# Patient Record
Sex: MALE | Race: OTHER | NOT HISPANIC OR LATINO | URBAN - METROPOLITAN AREA
[De-identification: names, ages, dates, MRNs, and addresses within clinical notes are randomized per-mention and may not be internally consistent; named-entity substitution may affect disease eponyms.]

---

## 2020-05-19 ENCOUNTER — EMERGENCY (EMERGENCY)
Facility: HOSPITAL | Age: 25
LOS: 1 days | Discharge: HOME | End: 2020-05-19
Attending: EMERGENCY MEDICINE
Payer: COMMERCIAL

## 2020-05-19 VITALS
HEART RATE: 72 BPM | TEMPERATURE: 98 F | SYSTOLIC BLOOD PRESSURE: 118 MMHG | HEIGHT: 72 IN | WEIGHT: 190.04 LBS | DIASTOLIC BLOOD PRESSURE: 75 MMHG | RESPIRATION RATE: 20 BRPM | OXYGEN SATURATION: 95 %

## 2020-05-19 PROCEDURE — 71045 X-RAY EXAM CHEST 1 VIEW: CPT | Mod: 26

## 2020-05-19 PROCEDURE — 71045 X-RAY EXAM CHEST 1 VIEW: CPT

## 2020-05-19 PROCEDURE — 99284 EMERGENCY DEPT VISIT MOD MDM: CPT | Mod: 25

## 2020-05-19 PROCEDURE — 94640 AIRWAY INHALATION TREATMENT: CPT

## 2020-05-19 PROCEDURE — 99285 EMERGENCY DEPT VISIT HI MDM: CPT

## 2020-05-19 RX ORDER — IPRATROPIUM/ALBUTEROL SULFATE 18-103MCG
3 AEROSOL WITH ADAPTER (GRAM) INHALATION ONCE
Refills: 0 | Status: COMPLETED | OUTPATIENT
Start: 2020-05-19 | End: 2020-05-19

## 2020-05-19 RX ORDER — LORATADINE 10 MG/1
10 TABLET ORAL DAILY
Refills: 0 | Status: DISCONTINUED | OUTPATIENT
Start: 2020-05-19 | End: 2020-05-24

## 2020-05-19 RX ORDER — FLUTICASONE PROPIONATE 50 MCG
1 SPRAY, SUSPENSION NASAL ONCE
Refills: 0 | Status: COMPLETED | OUTPATIENT
Start: 2020-05-19 | End: 2020-05-19

## 2020-05-19 RX ORDER — FLUTICASONE PROPIONATE 50 MCG
50 SPRAY, SUSPENSION NASAL
Qty: 1 | Refills: 0
Start: 2020-05-19

## 2020-05-19 RX ORDER — ALBUTEROL 90 UG/1
3 AEROSOL, METERED ORAL
Qty: 1 | Refills: 0
Start: 2020-05-19 | End: 2020-05-25

## 2020-05-19 RX ADMIN — Medication 3 MILLILITER(S): at 18:38

## 2020-05-19 RX ADMIN — LORATADINE 10 MILLIGRAM(S): 10 TABLET ORAL at 19:45

## 2020-05-19 RX ADMIN — Medication 1 SPRAY(S): at 19:45

## 2020-05-19 RX ADMIN — Medication 60 MILLIGRAM(S): at 18:38

## 2020-05-19 NOTE — ED STATDOCS - OBJECTIVE STATEMENT
23 y/o M pt with significant PMHx of asthma presents to the ED c/o asthma and seasonal allergies that began 8 days ago. Pt takes albuterol nebulizer twice a day but does not take pill medications for asthma. he states that his allergies have become "bad" and he constantly feels congested and his eyes are red. Pt denies fever. No further complaints at this time.

## 2020-05-19 NOTE — ED STATDOCS - PATIENT PORTAL LINK FT
You can access the FollowMyHealth Patient Portal offered by [facility] by registering at the following website: [email or web address]. By joining [employer]’s FollowMyHealth portal, you will also be able to view your health information using other applications (apps) compatible with our system.

## 2020-05-19 NOTE — ED STATDOCS - NSFOLLOWUPINSTRUCTIONS_ED_ALL_ED_FT
Asthma    Asthma is a condition in which the airways tighten and narrow, making it difficult to breath. Asthma episodes, also called asthma attacks, range from minor to life-threatening. Symptoms include wheezing, coughing, chest tightness, or shortness of breath. The diagnosis of asthma is made by a review of your medical history and a physical exam, but may involve additional testing. Asthma cannot be cured, but medicines and lifestyle changes can help control it. Avoid triggers of asthma which may include animal dander, pollen, mold, smoke, air pollutants, etc.     SEEK IMMEDIATE MEDICAL CARE IF YOU HAVE ANY OF THE FOLLOWING SYMPTOMS: worsening of symptoms, shortness of breath at rest, chest pain, bluish discoloration to lips or fingertips, lightheadedness/dizziness, or fever.     follow up with a pulmonologist in 1 to 3 days

## 2020-05-19 NOTE — ED STATDOCS - PROGRESS NOTE DETAILS
pt feels better post nebulizer, lungs CTA bilaterally. will dc home and rx meds and refer to pulmonologist

## 2020-05-19 NOTE — ED ADULT NURSE NOTE - OBJECTIVE STATEMENT
pt with reports of asthma exacerbation. pt awake alert oriented in no resp distress, wheezing noted on exam. afebrile, denies fevers at home. even and unlabored resps present.

## 2020-05-19 NOTE — ED STATDOCS - CPE ED MUSC NORM
How Many Skin Cancers Have You Had?: one What Is The Reason For Today's Visit?: Surveillance against skin cancer recurrences When Was Your Last Cancer Diagnosed?: 2016 normal...

## 2020-05-19 NOTE — ED STATDOCS - CARE PROVIDER_API CALL
Pb Solis  CRITICAL CARE MEDICINE  [address] [place] [id number]  Phone: [phone]  Fax: [phone]  Follow Up Time: 1-3 Days

## 2020-05-19 NOTE — ED STATDOCS - ATTENDING CONTRIBUTION TO CARE
I, Edmundo Balderrama, performed the initial face to face bedside interview with this patient regarding history of present illness, review of symptoms and relevant past medical, social and family history.  I completed an independent physical examination.  I was the initial provider who evaluated this patient. I have signed out the follow up of any pending tests (i.e. labs, radiological studies) to the ACP.  I have communicated the patient’s plan of care and disposition with the ACP.

## 2020-05-19 NOTE — ED ADULT TRIAGE NOTE - CHIEF COMPLAINT QUOTE
21 M, nad, breathing even and unlabored, alert and oriented x 4 c/o asthma/ allergy exacerbation x 9 days and not relieved by claritin, dayquil and home neb treatments. Pt denies fevers, denies sick contacts, denies recent travel. 24 M, nad, breathing even and unlabored, alert and oriented x 4 c/o asthma/ allergy exacerbation x 9 days and not relieved by claritin, dayquil and home neb treatments. Pt denies fevers, denies sick contacts, denies recent travel.

## 2020-05-19 NOTE — ED STATDOCS - CARE PROVIDERS DIRECT ADDRESSES
[email or web address]

## 2020-05-19 NOTE — ED STATDOCS - CLINICAL SUMMARY MEDICAL DECISION MAKING FREE TEXT BOX
Consistent with allergy induced asthma exacerbation. Check CXR and medically treat. Consistent with allergy induced asthma exacerbation. XR clear, no hypoxia or increased resp effort, stable for dc

## 2020-05-19 NOTE — ED ADULT NURSE NOTE - CHIEF COMPLAINT QUOTE
24 M, nad, breathing even and unlabored, alert and oriented x 4 c/o asthma/ allergy exacerbation x 9 days and not relieved by claritin, dayquil and home neb treatments. Pt denies fevers, denies sick contacts, denies recent travel.

## 2020-11-11 ENCOUNTER — EMERGENCY (EMERGENCY)
Facility: HOSPITAL | Age: 25
LOS: 1 days | Discharge: HOME | End: 2020-11-11
Attending: EMERGENCY MEDICINE
Payer: COMMERCIAL

## 2020-11-11 VITALS
HEIGHT: 72 IN | OXYGEN SATURATION: 100 % | SYSTOLIC BLOOD PRESSURE: 107 MMHG | RESPIRATION RATE: 16 BRPM | DIASTOLIC BLOOD PRESSURE: 58 MMHG | TEMPERATURE: 99 F | HEART RATE: 61 BPM | WEIGHT: 190.04 LBS

## 2020-11-11 LAB
ALBUMIN SERPL ELPH-MCNC: 4.4 G/DL — SIGNIFICANT CHANGE UP (ref 3.3–5.2)
ALP SERPL-CCNC: 74 U/L — SIGNIFICANT CHANGE UP (ref 40–120)
ALT FLD-CCNC: 17 U/L — SIGNIFICANT CHANGE UP
ANION GAP SERPL CALC-SCNC: 10 MMOL/L — SIGNIFICANT CHANGE UP (ref 5–17)
APPEARANCE UR: CLEAR — SIGNIFICANT CHANGE UP
AST SERPL-CCNC: 26 U/L — SIGNIFICANT CHANGE UP
BASOPHILS # BLD AUTO: 0.03 K/UL — SIGNIFICANT CHANGE UP (ref 0–0.2)
BASOPHILS NFR BLD AUTO: 0.4 % — SIGNIFICANT CHANGE UP (ref 0–2)
BILIRUB SERPL-MCNC: 0.6 MG/DL — SIGNIFICANT CHANGE UP (ref 0.4–2)
BILIRUB UR-MCNC: NEGATIVE — SIGNIFICANT CHANGE UP
BUN SERPL-MCNC: 10 MG/DL — SIGNIFICANT CHANGE UP (ref 8–20)
CALCIUM SERPL-MCNC: 9.5 MG/DL — SIGNIFICANT CHANGE UP (ref 8.6–10.2)
CHLORIDE SERPL-SCNC: 98 MMOL/L — SIGNIFICANT CHANGE UP (ref 98–107)
CO2 SERPL-SCNC: 27 MMOL/L — SIGNIFICANT CHANGE UP (ref 22–29)
COLOR SPEC: YELLOW — SIGNIFICANT CHANGE UP
CREAT SERPL-MCNC: 1.02 MG/DL — SIGNIFICANT CHANGE UP (ref 0.5–1.3)
DIFF PNL FLD: NEGATIVE — SIGNIFICANT CHANGE UP
EOSINOPHIL # BLD AUTO: 0.24 K/UL — SIGNIFICANT CHANGE UP (ref 0–0.5)
EOSINOPHIL NFR BLD AUTO: 3.3 % — SIGNIFICANT CHANGE UP (ref 0–6)
GLUCOSE SERPL-MCNC: 123 MG/DL — HIGH (ref 70–99)
GLUCOSE UR QL: NEGATIVE MG/DL — SIGNIFICANT CHANGE UP
HCT VFR BLD CALC: 40.1 % — SIGNIFICANT CHANGE UP (ref 39–50)
HGB BLD-MCNC: 13.4 G/DL — SIGNIFICANT CHANGE UP (ref 13–17)
IMM GRANULOCYTES NFR BLD AUTO: 0.3 % — SIGNIFICANT CHANGE UP (ref 0–1.5)
KETONES UR-MCNC: NEGATIVE — SIGNIFICANT CHANGE UP
LEUKOCYTE ESTERASE UR-ACNC: NEGATIVE — SIGNIFICANT CHANGE UP
LIDOCAIN IGE QN: 13 U/L — LOW (ref 22–51)
LYMPHOCYTES # BLD AUTO: 2.34 K/UL — SIGNIFICANT CHANGE UP (ref 1–3.3)
LYMPHOCYTES # BLD AUTO: 31.8 % — SIGNIFICANT CHANGE UP (ref 13–44)
MCHC RBC-ENTMCNC: 31.6 PG — SIGNIFICANT CHANGE UP (ref 27–34)
MCHC RBC-ENTMCNC: 33.4 GM/DL — SIGNIFICANT CHANGE UP (ref 32–36)
MCV RBC AUTO: 94.6 FL — SIGNIFICANT CHANGE UP (ref 80–100)
MONOCYTES # BLD AUTO: 0.35 K/UL — SIGNIFICANT CHANGE UP (ref 0–0.9)
MONOCYTES NFR BLD AUTO: 4.7 % — SIGNIFICANT CHANGE UP (ref 2–14)
NEUTROPHILS # BLD AUTO: 4.39 K/UL — SIGNIFICANT CHANGE UP (ref 1.8–7.4)
NEUTROPHILS NFR BLD AUTO: 59.5 % — SIGNIFICANT CHANGE UP (ref 43–77)
NITRITE UR-MCNC: NEGATIVE — SIGNIFICANT CHANGE UP
PH UR: 7 — SIGNIFICANT CHANGE UP (ref 5–8)
PLATELET # BLD AUTO: 258 K/UL — SIGNIFICANT CHANGE UP (ref 150–400)
POTASSIUM SERPL-MCNC: 3.9 MMOL/L — SIGNIFICANT CHANGE UP (ref 3.5–5.3)
POTASSIUM SERPL-SCNC: 3.9 MMOL/L — SIGNIFICANT CHANGE UP (ref 3.5–5.3)
PROT SERPL-MCNC: 7 G/DL — SIGNIFICANT CHANGE UP (ref 6.6–8.7)
PROT UR-MCNC: NEGATIVE MG/DL — SIGNIFICANT CHANGE UP
RBC # BLD: 4.24 M/UL — SIGNIFICANT CHANGE UP (ref 4.2–5.8)
RBC # FLD: 11.4 % — SIGNIFICANT CHANGE UP (ref 10.3–14.5)
SODIUM SERPL-SCNC: 135 MMOL/L — SIGNIFICANT CHANGE UP (ref 135–145)
SP GR SPEC: 1 — LOW (ref 1.01–1.02)
TSH SERPL-MCNC: 1.06 UIU/ML — SIGNIFICANT CHANGE UP (ref 0.27–4.2)
UROBILINOGEN FLD QL: NEGATIVE MG/DL — SIGNIFICANT CHANGE UP
WBC # BLD: 7.37 K/UL — SIGNIFICANT CHANGE UP (ref 3.8–10.5)
WBC # FLD AUTO: 7.37 K/UL — SIGNIFICANT CHANGE UP (ref 3.8–10.5)

## 2020-11-11 PROCEDURE — 84443 ASSAY THYROID STIM HORMONE: CPT

## 2020-11-11 PROCEDURE — 99283 EMERGENCY DEPT VISIT LOW MDM: CPT

## 2020-11-11 PROCEDURE — 81003 URINALYSIS AUTO W/O SCOPE: CPT

## 2020-11-11 PROCEDURE — 83605 ASSAY OF LACTIC ACID: CPT

## 2020-11-11 PROCEDURE — 83690 ASSAY OF LIPASE: CPT

## 2020-11-11 PROCEDURE — U0003: CPT

## 2020-11-11 PROCEDURE — 80053 COMPREHEN METABOLIC PANEL: CPT

## 2020-11-11 PROCEDURE — 36415 COLL VENOUS BLD VENIPUNCTURE: CPT

## 2020-11-11 PROCEDURE — 99284 EMERGENCY DEPT VISIT MOD MDM: CPT

## 2020-11-11 PROCEDURE — 85025 COMPLETE CBC W/AUTO DIFF WBC: CPT

## 2020-11-11 NOTE — ED STATDOCS - CARE PROVIDER_API CALL
Karena Prasad  GASTROENTEROLOGY  [address] [facility] [facility] [address]  [place] [address]  Phone: [phone]  Fax: [phone]  Follow Up Time:

## 2020-11-11 NOTE — ED ADULT TRIAGE NOTE - CHIEF COMPLAINT QUOTE
Pt A&Ox4 states "I have preeti having diarrhea for a week and half." Patient having abd pain and diarrhea, no fever, no cough.

## 2020-11-11 NOTE — ED STATDOCS - OBJECTIVE STATEMENT
25 y/o M pt with significant PMHx of asthma presents to the ED c/o intermittent diarrhea directly after eating for about 2 weeks. Reports associated fatigue. Denies fevers. States he lost weight. He has been on the keto diet for weight loss. Denies feeling hot. No further complaints at this time.

## 2020-11-11 NOTE — ED STATDOCS - PATIENT PORTAL LINK FT
You can access the FollowMyHealth Patient Portal offered by [facility] by registering at the following website: [email or web address]. By joining [employer]’s FollowMyHealth portal, you will also be able to view your health information using other applications (apps) compatible with our system.

## 2020-11-12 PROBLEM — J45.909 UNSPECIFIED ASTHMA, UNCOMPLICATED: Chronic | Status: ACTIVE | Noted: 2020-05-19

## 2020-11-12 LAB — SARS-COV-2 RNA SPEC QL NAA+PROBE: SIGNIFICANT CHANGE UP

## 2021-09-10 ENCOUNTER — EMERGENCY (EMERGENCY)
Facility: HOSPITAL | Age: 26
LOS: 1 days | Discharge: HOME | End: 2021-09-10
Payer: COMMERCIAL

## 2021-09-10 VITALS
RESPIRATION RATE: 18 BRPM | DIASTOLIC BLOOD PRESSURE: 57 MMHG | OXYGEN SATURATION: 96 % | TEMPERATURE: 99 F | HEART RATE: 64 BPM | SYSTOLIC BLOOD PRESSURE: 102 MMHG

## 2021-09-10 VITALS — HEIGHT: 72 IN | WEIGHT: 179.9 LBS

## 2021-09-10 PROCEDURE — U0003: CPT

## 2021-09-10 PROCEDURE — 99282 EMERGENCY DEPT VISIT SF MDM: CPT

## 2021-09-10 PROCEDURE — U0005: CPT

## 2021-09-10 PROCEDURE — 99283 EMERGENCY DEPT VISIT LOW MDM: CPT

## 2021-09-10 NOTE — ED PROVIDER NOTE - PATIENT PORTAL LINK FT
You can access the FollowMyHealth Patient Portal offered by [facility] by registering at the following website: [email or web address]. By joining [employer]’s FollowMyHealth portal, you will also be able to view your health information using other applications (apps) compatible with our system.

## 2021-09-10 NOTE — ED PROVIDER NOTE - OBJECTIVE STATEMENT
COVID ASYMPTOMATIC SWAB  Pt presenting to the ER for COVID-19 testing. Denies fevers chills, loss of taste or smell, URI symptoms, chest pain or shortness of breath, nausea vomiting diarrhea abdominal pain, weakness or fatigue. Eating and drinking normal diet. Normal output. Pt requesting testing at this time. [] known exposure [] no-known exposure [] travel [] no travel [ ] smoker [ ] non-smoker  for event, denies any symptoms

## 2021-09-10 NOTE — ED PROVIDER NOTE - CLINICAL SUMMARY MEDICAL DECISION MAKING FREE TEXT BOX
Pt nontoxic appearing, stable vitals, ambulatory with stable saturation without supplemental oxygen. PT does not meet criteria listed in most updated guidelines as per [facility] protocol/algorithm for admission at this time. pt advised about self-quarantine instructions until negative test results and/or symptom resolution. pt advised on hand hygiene, monitoring of symptoms, antipyretic use as well as and fu with primary care provider. Instructions given in pre-printed copy.

## 2021-09-10 NOTE — ED PROVIDER NOTE - PHYSICAL EXAMINATION
Const: AOX3 nontoxic appearing, no apparent respiratory or physical distress. Stable gait   HEENT: NC/AT. Moist mucous membranes.  Eyes: ANIYAH. EOMI  Neck: Soft and supple. Full ROM without pain.  Resp: Speaking in full sentences. No evidence of respiratory distress. No wheezes, rales or rhonchi. No adventitious breath sounds   Back: Spine midline and non-tender. No CVAT.  Skin: No rashes, abrasions or lacerations.  Neuro: Awake, alert & oriented x 3. Moves all extremities symmetrically.

## 2021-09-11 LAB — SARS-COV-2 RNA SPEC QL NAA+PROBE: SIGNIFICANT CHANGE UP

## 2021-11-25 ENCOUNTER — EMERGENCY (EMERGENCY)
Facility: HOSPITAL | Age: 26
LOS: 1 days | Discharge: HOME | End: 2021-11-25
Attending: EMERGENCY MEDICINE
Payer: COMMERCIAL

## 2021-11-25 VITALS
HEART RATE: 59 BPM | OXYGEN SATURATION: 97 % | TEMPERATURE: 98 F | SYSTOLIC BLOOD PRESSURE: 111 MMHG | HEIGHT: 72 IN | RESPIRATION RATE: 20 BRPM | WEIGHT: 210.1 LBS | DIASTOLIC BLOOD PRESSURE: 56 MMHG

## 2021-11-25 LAB
ALBUMIN SERPL ELPH-MCNC: 4.4 G/DL — SIGNIFICANT CHANGE UP (ref 3.3–5.2)
ALP SERPL-CCNC: 110 U/L — SIGNIFICANT CHANGE UP (ref 40–120)
ALT FLD-CCNC: 16 U/L — SIGNIFICANT CHANGE UP
ANION GAP SERPL CALC-SCNC: 12 MMOL/L — SIGNIFICANT CHANGE UP (ref 5–17)
AST SERPL-CCNC: 27 U/L — SIGNIFICANT CHANGE UP
BASOPHILS # BLD AUTO: 0.02 K/UL — SIGNIFICANT CHANGE UP (ref 0–0.2)
BASOPHILS NFR BLD AUTO: 0.4 % — SIGNIFICANT CHANGE UP (ref 0–2)
BILIRUB SERPL-MCNC: 0.6 MG/DL — SIGNIFICANT CHANGE UP (ref 0.4–2)
BUN SERPL-MCNC: 14.2 MG/DL — SIGNIFICANT CHANGE UP (ref 8–20)
CALCIUM SERPL-MCNC: 9.5 MG/DL — SIGNIFICANT CHANGE UP (ref 8.6–10.2)
CHLORIDE SERPL-SCNC: 97 MMOL/L — LOW (ref 98–107)
CO2 SERPL-SCNC: 27 MMOL/L — SIGNIFICANT CHANGE UP (ref 22–29)
CREAT SERPL-MCNC: 1.27 MG/DL — SIGNIFICANT CHANGE UP (ref 0.5–1.3)
EOSINOPHIL # BLD AUTO: 0.08 K/UL — SIGNIFICANT CHANGE UP (ref 0–0.5)
EOSINOPHIL NFR BLD AUTO: 1.8 % — SIGNIFICANT CHANGE UP (ref 0–6)
GLUCOSE SERPL-MCNC: 83 MG/DL — SIGNIFICANT CHANGE UP (ref 70–99)
HCT VFR BLD CALC: 45.4 % — SIGNIFICANT CHANGE UP (ref 39–50)
HGB BLD-MCNC: 15.2 G/DL — SIGNIFICANT CHANGE UP (ref 13–17)
IMM GRANULOCYTES NFR BLD AUTO: 0.2 % — SIGNIFICANT CHANGE UP (ref 0–1.5)
LIDOCAIN IGE QN: 9 U/L — LOW (ref 22–51)
LYMPHOCYTES # BLD AUTO: 1.77 K/UL — SIGNIFICANT CHANGE UP (ref 1–3.3)
LYMPHOCYTES # BLD AUTO: 39.2 % — SIGNIFICANT CHANGE UP (ref 13–44)
MCHC RBC-ENTMCNC: 30.7 PG — SIGNIFICANT CHANGE UP (ref 27–34)
MCHC RBC-ENTMCNC: 33.5 GM/DL — SIGNIFICANT CHANGE UP (ref 32–36)
MCV RBC AUTO: 91.7 FL — SIGNIFICANT CHANGE UP (ref 80–100)
MONOCYTES # BLD AUTO: 0.66 K/UL — SIGNIFICANT CHANGE UP (ref 0–0.9)
MONOCYTES NFR BLD AUTO: 14.6 % — HIGH (ref 2–14)
NEUTROPHILS # BLD AUTO: 1.97 K/UL — SIGNIFICANT CHANGE UP (ref 1.8–7.4)
NEUTROPHILS NFR BLD AUTO: 43.8 % — SIGNIFICANT CHANGE UP (ref 43–77)
PLATELET # BLD AUTO: 241 K/UL — SIGNIFICANT CHANGE UP (ref 150–400)
POTASSIUM SERPL-MCNC: 4 MMOL/L — SIGNIFICANT CHANGE UP (ref 3.5–5.3)
POTASSIUM SERPL-SCNC: 4 MMOL/L — SIGNIFICANT CHANGE UP (ref 3.5–5.3)
PROT SERPL-MCNC: 7.3 G/DL — SIGNIFICANT CHANGE UP (ref 6.6–8.7)
RBC # BLD: 4.95 M/UL — SIGNIFICANT CHANGE UP (ref 4.2–5.8)
RBC # FLD: 11.2 % — SIGNIFICANT CHANGE UP (ref 10.3–14.5)
SODIUM SERPL-SCNC: 136 MMOL/L — SIGNIFICANT CHANGE UP (ref 135–145)
WBC # BLD: 4.51 K/UL — SIGNIFICANT CHANGE UP (ref 3.8–10.5)
WBC # FLD AUTO: 4.51 K/UL — SIGNIFICANT CHANGE UP (ref 3.8–10.5)

## 2021-11-25 PROCEDURE — 99284 EMERGENCY DEPT VISIT MOD MDM: CPT

## 2021-11-25 PROCEDURE — 36415 COLL VENOUS BLD VENIPUNCTURE: CPT

## 2021-11-25 PROCEDURE — 85025 COMPLETE CBC W/AUTO DIFF WBC: CPT

## 2021-11-25 PROCEDURE — 83690 ASSAY OF LIPASE: CPT

## 2021-11-25 PROCEDURE — 80053 COMPREHEN METABOLIC PANEL: CPT

## 2021-11-25 PROCEDURE — 99283 EMERGENCY DEPT VISIT LOW MDM: CPT

## 2021-11-25 RX ORDER — SODIUM CHLORIDE 9 MG/ML
1000 INJECTION INTRAMUSCULAR; INTRAVENOUS; SUBCUTANEOUS ONCE
Refills: 0 | Status: COMPLETED | OUTPATIENT
Start: 2021-11-25 | End: 2021-11-25

## 2021-11-25 RX ORDER — ONDANSETRON 8 MG/1
1 TABLET, FILM COATED ORAL
Qty: 12 | Refills: 0
Start: 2021-11-25 | End: 2021-11-27

## 2021-11-25 RX ADMIN — SODIUM CHLORIDE 1000 MILLILITER(S): 9 INJECTION INTRAMUSCULAR; INTRAVENOUS; SUBCUTANEOUS at 17:47

## 2021-11-25 NOTE — ED STATDOCS - OBJECTIVE STATEMENT
26 y/o male with PMHx of asthma presents to ED c/o nausea. Patient reports 25 yr old M presented ot ED with a 4 day history oa abdominal discomfort and sensation of feeling bloating. Pt denies any specific abdominal pain and explained that x 2 day ago he had 2-3 episode of vomiting and diarrhea. Pt explained that his diarrhea was non foul smelling and non bloody. Pt also says that his vomiting was non bloody. Pt also had 2 episode of diarrhea today but no vomiting. Pt also denies any fever, chills , body aches , shortness of breath or difficulty breathing. Pt denies any contact with any COVID19 patients and is fully vaccinated. Pt also denies any recent  travels outside the [place]. 25 yr old M presented ot ED with a 4 day history of abdominal discomfort and sensation of feeling bloating. Pt denies any specific abdominal pain and explained that x 2 day ago he had 2-3 episode of vomiting and diarrhea. Pt explained that his diarrhea was non foul smelling and non bloody. Pt also says that his vomiting was non bloody. Pt also had 2 episode of diarrhea today but no vomiting. Pt also denies any fever, chills , body aches , shortness of breath or difficulty breathing. Pt denies any contact with any COVID19 patients and is fully vaccinated. Pt also denies any recent  travels outside the [place].

## 2021-11-25 NOTE — ED STATDOCS - PROGRESS NOTE DETAILS
PT reports relief of abdominal pain. Abdomen is soft, non tender, no rebound, no guarding at time of discharge. Abdominal pain precautions reviewed.

## 2021-11-25 NOTE — ED STATDOCS - CLINICAL SUMMARY MEDICAL DECISION MAKING FREE TEXT BOX
25 yr old M presented ot ED with a 4 day history oa abdominal discomfort and sensation of feeling bloating. Pt denies any specific abdominal pain and explained that x 2 day ago he had 2-3 episode of vomiting and diarrhea. Pt explained that his diarrhea was non foul smelling and non bloody. Pt also says that his vomiting was non bloody. Pt also had 2 episode of diarrhea today but no vomiting. Examination within acceptable limits.

## 2021-11-25 NOTE — ED ADULT TRIAGE NOTE - CHIEF COMPLAINT QUOTE
Pt arrives to ED c/o nausea and vomiting since yesterday " I think its food positioning " + diarrhea

## 2021-11-25 NOTE — ED STATDOCS - NS_ ATTENDINGSCRIBEDETAILS _ED_A_ED_FT
I, Irvin Monterroso, performed the initial face to face bedside interview with this patient regarding history of present illness, review of symptoms and relevant past medical, social and family history.  I completed an independent physical examination.  I was the initial provider who evaluated this patient. I have signed out the follow up of any pending tests (i.e. labs, radiological studies) to the ACP.  I have communicated the patient’s plan of care and disposition with the ACP.  The history, relevant review of systems, past medical and surgical history, medical decision making, and physical examination was documented by the scribe in my presence and I attest to the accuracy of the documentation.

## 2021-11-25 NOTE — ED STATDOCS - PATIENT PORTAL LINK FT
You can access the FollowMyHealth Patient Portal offered by [facility] by registering at the following website: [email or web address]. By joining [employer]’s FollowMyHealth portal, you will also be able to view your health information using other applications (apps) compatible with our system.

## 2022-04-29 ENCOUNTER — EMERGENCY (EMERGENCY)
Facility: HOSPITAL | Age: 27
LOS: 1 days | Discharge: HOME | End: 2022-04-29
Attending: EMERGENCY MEDICINE
Payer: COMMERCIAL

## 2022-04-29 VITALS
RESPIRATION RATE: 18 BRPM | OXYGEN SATURATION: 97 % | HEIGHT: 72 IN | SYSTOLIC BLOOD PRESSURE: 113 MMHG | TEMPERATURE: 98 F | HEART RATE: 61 BPM | WEIGHT: 220.02 LBS | DIASTOLIC BLOOD PRESSURE: 68 MMHG

## 2022-04-29 PROCEDURE — 99284 EMERGENCY DEPT VISIT MOD MDM: CPT

## 2022-04-29 PROCEDURE — 99283 EMERGENCY DEPT VISIT LOW MDM: CPT | Mod: 25

## 2022-04-29 PROCEDURE — 94640 AIRWAY INHALATION TREATMENT: CPT

## 2022-04-29 RX ORDER — IPRATROPIUM/ALBUTEROL SULFATE 18-103MCG
3 AEROSOL WITH ADAPTER (GRAM) INHALATION
Refills: 0 | Status: DISCONTINUED | OUTPATIENT
Start: 2022-04-29 | End: 2022-05-04

## 2022-04-29 RX ORDER — FLUTICASONE PROPIONATE 50 MCG
2 SPRAY, SUSPENSION NASAL
Qty: 1 | Refills: 1
Start: 2022-04-29

## 2022-04-29 RX ORDER — AZELASTINE HCL 0.05 %
1 DROPS OPHTHALMIC (EYE)
Qty: 1 | Refills: 0
Start: 2022-04-29

## 2022-04-29 RX ORDER — ALBUTEROL 90 UG/1
2 AEROSOL, METERED ORAL
Qty: 1 | Refills: 1
Start: 2022-04-29

## 2022-04-29 RX ADMIN — Medication 60 MILLIGRAM(S): at 16:00

## 2022-04-29 RX ADMIN — Medication 3 MILLILITER(S): at 16:00

## 2022-04-29 NOTE — ED PROVIDER NOTE - ATTENDING APP SHARED VISIT CONTRIBUTION OF CARE
The patient seen and examined    Asthma Exacerbation    I, Chepe Bowden, performed the initial face to face bedside interview with this patient regarding history of present illness, review of symptoms and relevant past medical, social and family history.  I completed an independent physical examination.  I was the initial provider who evaluated this patient. I have signed out the follow up of any pending tests (i.e. labs, radiological studies) to the ACP.  I have communicated the patient’s plan of care and disposition with the ACP.

## 2022-04-29 NOTE — ED PROVIDER NOTE - PATIENT PORTAL LINK FT
You can access the FollowMyHealth Patient Portal offered by [facility] by registering at the following website: [email or web address]. By joining [facility]’s FollowMyHealth portal, you will also be able to view your health information using other applications (apps) compatible with our system.

## 2022-04-29 NOTE — ED ADULT TRIAGE NOTE - CHIEF COMPLAINT QUOTE
Patient arrived to ED today with c/o asthma exacerbation.  Patient states he believes allergies are making his asthma worse.  Patient states wheezing, eye irritation, runny nose. Patient arrived to ED today with c/o asthma exacerbation.  Patient states he believes allergies are making his asthma worse.  Patient states wheezing, throat pains, eye irritation, runny nose.

## 2022-04-29 NOTE — ED ADULT NURSE NOTE - OBJECTIVE STATEMENT
Assumed care of patient in ED alert and oriented x4, resting comfortably, c/o asthma exacerbation x 4 days. Pt stated he has been having seasonal allergies, was recently diagnosed with strep throat and now c/o asthma. Medications given, respirartions even non labored. Bilateral wheezing noted.

## 2022-04-29 NOTE — ED PROVIDER NOTE - OBJECTIVE STATEMENT
26 yr old M presented to ED for asthma exacerbation. Pt explained that he have been experiencing a lot of season allergy leading to asthma attack. Pt explained that he have been using his inhaler a lot but he still has wheezing. Pt denies any coughing , SOB or difficulty breathing. Pt denies any fever, chills or body aches.

## 2022-04-29 NOTE — ED ADULT NURSE NOTE - CHIEF COMPLAINT QUOTE
Patient arrived to ED today with c/o asthma exacerbation.  Patient states he believes allergies are making his asthma worse.  Patient states wheezing, throat pains, eye irritation, runny nose.

## 2022-04-29 NOTE — ED PROVIDER NOTE - CLINICAL SUMMARY MEDICAL DECISION MAKING FREE TEXT BOX
6 yr old M presented to ED for asthma exacerbation. Pt explained that he have been experiencing a lot of season allergy leading to asthma attack. Pt explained that he have been using his inhaler a lot but he still has wheezing. Pt denies any coughing , SOB or difficulty breathing. Pt treated with Duoneb abd prednisone. pt tolerated medication well and D/C in stable condition.

## 2022-04-29 NOTE — ED PROVIDER NOTE - PROGRESS NOTE DETAILS
Pt treated with Duoneb and prednisone. pt tolerated medication well in ED and D/C with Rx sent to his pharmacy.

## 2022-05-13 ENCOUNTER — EMERGENCY (EMERGENCY)
Facility: HOSPITAL | Age: 27
LOS: 1 days | Discharge: HOME | End: 2022-05-13
Attending: STUDENT IN AN ORGANIZED HEALTH CARE EDUCATION/TRAINING PROGRAM
Payer: COMMERCIAL

## 2022-05-13 VITALS
SYSTOLIC BLOOD PRESSURE: 128 MMHG | TEMPERATURE: 98 F | HEART RATE: 84 BPM | DIASTOLIC BLOOD PRESSURE: 60 MMHG | OXYGEN SATURATION: 100 % | RESPIRATION RATE: 20 BRPM

## 2022-05-13 VITALS
SYSTOLIC BLOOD PRESSURE: 101 MMHG | DIASTOLIC BLOOD PRESSURE: 58 MMHG | RESPIRATION RATE: 20 BRPM | OXYGEN SATURATION: 95 % | TEMPERATURE: 98 F | HEIGHT: 72 IN | HEART RATE: 75 BPM

## 2022-05-13 LAB
RAPID RVP RESULT: SIGNIFICANT CHANGE UP
SARS-COV-2 RNA SPEC QL NAA+PROBE: SIGNIFICANT CHANGE UP

## 2022-05-13 PROCEDURE — 99284 EMERGENCY DEPT VISIT MOD MDM: CPT

## 2022-05-13 PROCEDURE — 94640 AIRWAY INHALATION TREATMENT: CPT

## 2022-05-13 PROCEDURE — 71046 X-RAY EXAM CHEST 2 VIEWS: CPT | Mod: 26

## 2022-05-13 PROCEDURE — 71046 X-RAY EXAM CHEST 2 VIEWS: CPT

## 2022-05-13 PROCEDURE — 99284 EMERGENCY DEPT VISIT MOD MDM: CPT | Mod: 25

## 2022-05-13 PROCEDURE — 96374 THER/PROPH/DIAG INJ IV PUSH: CPT

## 2022-05-13 PROCEDURE — 0225U NFCT DS DNA&RNA 21 SARSCOV2: CPT

## 2022-05-13 RX ORDER — IPRATROPIUM/ALBUTEROL SULFATE 18-103MCG
3 AEROSOL WITH ADAPTER (GRAM) INHALATION ONCE
Refills: 0 | Status: COMPLETED | OUTPATIENT
Start: 2022-05-13 | End: 2022-05-13

## 2022-05-13 RX ORDER — MAGNESIUM SULFATE 500 MG/ML
2 VIAL (ML) INJECTION ONCE
Refills: 0 | Status: COMPLETED | OUTPATIENT
Start: 2022-05-13 | End: 2022-05-13

## 2022-05-13 RX ADMIN — Medication 3 MILLILITER(S): at 15:49

## 2022-05-13 RX ADMIN — Medication 150 GRAM(S): at 15:48

## 2022-05-13 NOTE — ED PROVIDER NOTE - PATIENT PORTAL LINK FT
You can access the FollowMyHealth Patient Portal offered by [facility] by registering at the following website: [email or web address]. By joining [employer]’s FollowMyHealth portal, you will also be able to view your health information using other applications (apps) compatible with our system.

## 2022-05-13 NOTE — ED PROVIDER NOTE - PHYSICAL EXAMINATION
Constitutional - well-developed.   Head - NCAT. Airway patent.   Eyes - PERRL.   CV - RRR. no murmur. no edema.   Pulm - mild scattered b/l wheezing  Abd - soft, nt. no rebound. no guarding.   Neuro - A&Ox3. strength 5/5 x4. sensation intact x4. normal gait.   Skin - No rash. .  MSK - normal ROM.

## 2022-05-13 NOTE — ED ADULT NURSE NOTE - DRUG PRE-SCREENING (DAST -1)
Left message for the patient that an update of care everywhere was performed; [facility] clinical summary noted after update.  Advised patient that she could always contact [facility] and have them send records to ensure we have all documentation of test performed and results.  
Patient states she had a bunch of testing done at [facility] a couple of weeks back and she is wondering if doctor will be able to see those results or is there something she should do to get them. Please call [phone]  
Statement Selected

## 2022-05-13 NOTE — ED PROVIDER NOTE - NS ED ROS FT
No fever/chills   No photophobia/eye pain/changes in visio,   No ear pain/sore throat/dysphagia   No chest pain/palpitations  + SOB/cough/wheeze no stridor   No abdominal pain, No N/V/D  No dysuria/frequency/discharge   No neck/back pain,   No rash  No changes in neurological status/function.

## 2022-05-13 NOTE — ED ADULT NURSE NOTE - OBJECTIVE STATEMENT
pt with reports of asthma exacerbation. states he has been having cough and SOB, taking his neb at home without relief. states on Prednisone as well. pt is awake and alert, no chest pain, skin warm dry and intact, states no chest pain.

## 2022-05-13 NOTE — ED PROVIDER NOTE - OBJECTIVE STATEMENT
Pt is a 27 yo M co cough sob. Pt states that two weeks ago he developed an asthma exacerbation.  Pt states that every morning he wakes up feeling short of breath and wheezing.  Pt states that he was here when it started and started on albuterol and steroids with little relief. no cp. no n/v. no fever/chills. no other complaints.

## 2022-05-13 NOTE — ED ADULT TRIAGE NOTE - GLASGOW COMA SCALE: SCORE, MLM
Care of patient assumed from the anesthesia provider. Patient tolerated procedure well. Abdomen remains soft and non tender post procedure, no complaints or indication of discomfort noted at this time. See anesthesia note. 15

## 2022-05-13 NOTE — ED PROVIDER NOTE - CARE PROVIDER_API CALL
Pb Solis (DO)  Critical Care Medicine; Internal Medicine; Pulmonary Disease  [address] [place] [address]  Phone: [phone]  Fax: [phone]  Follow Up Time: 1-3 Days

## 2022-05-13 NOTE — ED PROVIDER NOTE - CLINICAL SUMMARY MEDICAL DECISION MAKING FREE TEXT BOX
Pt feeling much better after nebs, mag and steroids.  CXR with reactive airway disease.  Pt reassured and instructed to f/up with pulm. instructed to return for worsening cough, sob, or any other concerns.  Pt given a copy of all results and instructed to f/up with pcp regarding any abnormal results.

## 2022-05-17 PROBLEM — Z00.00 ENCOUNTER FOR PREVENTIVE HEALTH EXAMINATION: Status: ACTIVE | Noted: 2022-05-17

## 2022-05-19 ENCOUNTER — NON-APPOINTMENT (OUTPATIENT)
Age: 27
End: 2022-05-19

## 2022-05-19 ENCOUNTER — APPOINTMENT (OUTPATIENT)
Dept: PULMONOLOGY | Facility: CLINIC | Age: 27
End: 2022-05-19
Payer: COMMERCIAL

## 2022-05-19 VITALS
OXYGEN SATURATION: 95 % | DIASTOLIC BLOOD PRESSURE: 74 MMHG | HEIGHT: 72 IN | BODY MASS INDEX: 30.61 KG/M2 | HEART RATE: 66 BPM | WEIGHT: 226 LBS | RESPIRATION RATE: 16 BRPM | SYSTOLIC BLOOD PRESSURE: 108 MMHG

## 2022-05-19 PROCEDURE — 94727 GAS DIL/WSHOT DETER LNG VOL: CPT

## 2022-05-19 PROCEDURE — 94010 BREATHING CAPACITY TEST: CPT

## 2022-05-19 PROCEDURE — 94729 DIFFUSING CAPACITY: CPT

## 2022-05-19 PROCEDURE — 85018 HEMOGLOBIN: CPT | Mod: QW

## 2022-05-19 PROCEDURE — 99204 OFFICE O/P NEW MOD 45 MIN: CPT

## 2022-05-19 RX ORDER — ALBUTEROL SULFATE 90 UG/1
108 (90 BASE) INHALANT RESPIRATORY (INHALATION)
Refills: 0 | Status: ACTIVE | COMMUNITY

## 2022-05-19 RX ORDER — FLUTICASONE FUROATE 200 UG/1
200 POWDER RESPIRATORY (INHALATION) DAILY
Qty: 1 | Refills: 5 | Status: ACTIVE | COMMUNITY
Start: 2022-05-19 | End: 1900-01-01

## 2022-05-19 RX ORDER — ALBUTEROL SULFATE 2.5 MG/3ML
(2.5 MG/3ML) SOLUTION RESPIRATORY (INHALATION)
Refills: 0 | Status: ACTIVE | COMMUNITY

## 2022-05-19 NOTE — DISCUSSION/SUMMARY
[Asthma] : asthma [Decompensated] : decompensated [Mild Persistent] : mild persistent [PFTs] : pulmonary function tests [Medication Changes Per Orders] : Medication changes are as documented in orders [Patient] : the patient

## 2022-05-19 NOTE — HISTORY OF PRESENT ILLNESS
[TextBox_4] : 26-year-old male with a history of asthma since early childhood.  Patient does well until early to late spring when he usually develops an exacerbation requiring steroids.  Most recently has had similar exacerbation requiring 2 courses of steroids and a recent ER evaluation at [facility] on 5/13/2022.  He completed a Medrol Dosepak with significant improvement in symptoms.  He has been using his rescue inhaler on a daily basis or nebulized bronchodilator.  There is no history of fevers, chills, sinus headaches, postnasal drip, heartburn.

## 2022-06-30 ENCOUNTER — NON-APPOINTMENT (OUTPATIENT)
Age: 27
End: 2022-06-30

## 2022-07-01 ENCOUNTER — APPOINTMENT (OUTPATIENT)
Dept: PULMONOLOGY | Facility: CLINIC | Age: 27
End: 2022-07-01

## 2022-07-01 VITALS — HEIGHT: 72 IN | BODY MASS INDEX: 30.61 KG/M2 | WEIGHT: 226 LBS

## 2022-07-01 PROCEDURE — 94727 GAS DIL/WSHOT DETER LNG VOL: CPT

## 2022-07-01 PROCEDURE — 94010 BREATHING CAPACITY TEST: CPT

## 2022-07-01 PROCEDURE — 94729 DIFFUSING CAPACITY: CPT

## 2022-07-01 PROCEDURE — 85018 HEMOGLOBIN: CPT | Mod: QW

## 2022-07-13 ENCOUNTER — APPOINTMENT (OUTPATIENT)
Dept: PULMONOLOGY | Facility: CLINIC | Age: 27
End: 2022-07-13

## 2022-07-13 VITALS
TEMPERATURE: 98 F | BODY MASS INDEX: 31.15 KG/M2 | HEART RATE: 63 BPM | OXYGEN SATURATION: 97 % | WEIGHT: 230 LBS | DIASTOLIC BLOOD PRESSURE: 60 MMHG | SYSTOLIC BLOOD PRESSURE: 100 MMHG | HEIGHT: 72 IN

## 2022-07-13 DIAGNOSIS — J45.909 UNSPECIFIED ASTHMA, UNCOMPLICATED: ICD-10-CM

## 2022-07-13 PROCEDURE — 99214 OFFICE O/P EST MOD 30 MIN: CPT

## 2022-07-13 NOTE — HISTORY OF PRESENT ILLNESS
[Never] : never [Mild Persistent] : mild persistent [Doing Well] : doing well [None] : The patient is currently asymptomatic [More Frequent Use Needed Recently] : normal [Adherent] : the patient is not adherent with ~his/her~ medication regimen [de-identified] : self dc'ed arnuity 1 month ago

## 2022-07-13 NOTE — DISCUSSION/SUMMARY
[Asthma] : asthma [PFTs] : pulmonary function tests [Medication Changes Per Orders] : Medication changes are as documented in orders [Patient] : the patient [Stable] : stable [Responding to Treatment] : responding to treatment [Intermittent] : intermittent

## 2023-01-12 ENCOUNTER — APPOINTMENT (OUTPATIENT)
Dept: PULMONOLOGY | Facility: CLINIC | Age: 28
End: 2023-01-12

## 2023-04-26 ENCOUNTER — EMERGENCY (EMERGENCY)
Facility: HOSPITAL | Age: 28
LOS: 1 days | Discharge: HOME | End: 2023-04-26
Attending: EMERGENCY MEDICINE
Payer: COMMERCIAL

## 2023-04-26 VITALS
HEART RATE: 64 BPM | SYSTOLIC BLOOD PRESSURE: 121 MMHG | WEIGHT: 214.95 LBS | TEMPERATURE: 98 F | HEIGHT: 72 IN | RESPIRATION RATE: 18 BRPM | OXYGEN SATURATION: 95 % | DIASTOLIC BLOOD PRESSURE: 58 MMHG

## 2023-04-26 PROCEDURE — 94640 AIRWAY INHALATION TREATMENT: CPT

## 2023-04-26 PROCEDURE — 99284 EMERGENCY DEPT VISIT MOD MDM: CPT

## 2023-04-26 PROCEDURE — 99283 EMERGENCY DEPT VISIT LOW MDM: CPT | Mod: 25

## 2023-04-26 RX ORDER — MONTELUKAST 4 MG/1
1 TABLET, CHEWABLE ORAL
Qty: 30 | Refills: 0
Start: 2023-04-26 | End: 2023-05-25

## 2023-04-26 RX ORDER — KETOTIFEN FUMARATE 0.34 MG/ML
2 SOLUTION OPHTHALMIC ONCE
Refills: 0 | Status: COMPLETED | OUTPATIENT
Start: 2023-04-26 | End: 2023-04-26

## 2023-04-26 RX ORDER — IPRATROPIUM/ALBUTEROL SULFATE 18-103MCG
3 AEROSOL WITH ADAPTER (GRAM) INHALATION ONCE
Refills: 0 | Status: COMPLETED | OUTPATIENT
Start: 2023-04-26 | End: 2023-04-26

## 2023-04-26 RX ORDER — IPRATROPIUM/ALBUTEROL SULFATE 18-103MCG
3 AEROSOL WITH ADAPTER (GRAM) INHALATION ONCE
Refills: 0 | Status: DISCONTINUED | OUTPATIENT
Start: 2023-04-26 | End: 2023-04-26

## 2023-04-26 RX ADMIN — KETOTIFEN FUMARATE 2 DROP(S): 0.34 SOLUTION OPHTHALMIC at 19:18

## 2023-04-26 RX ADMIN — Medication 3 MILLILITER(S): at 19:07

## 2023-04-26 RX ADMIN — Medication 50 MILLIGRAM(S): at 19:06

## 2023-04-26 NOTE — ED PROVIDER NOTE - RESPIRATORY [+], MLM
Call to pt, advising that referral is in place for GI, and that can be scheduled with Vega GARCIA at [phone].  Pt states she will call for scheduling.    COUGH/SHORTNESS OF BREATH

## 2023-04-26 NOTE — ED PROVIDER NOTE - CLINICAL SUMMARY MEDICAL DECISION MAKING FREE TEXT BOX
26 y/o male with hx of asthma presents to the Ed c/o cough, shortness of breath and sinus congestion for the pat 3 days. pt sating well, minimal wheezing, improved after meds, given eye drops for itchy eyes, dc home on steroids, Pt reassessed, pt feeling better at this time, vss, pt able to walk, talk and vocalized plan of action. Discussed in depth and explained to pt in depth the next steps that need to be taking including proper follow up with PCP or specialists. All incidental findings were discussed with pt as well. Pt verbalized their concerns and all questions were answered. Pt understands dispo and wants discharge. Given good instructions when to return to ED and importance of f/u.

## 2023-04-26 NOTE — ED PROVIDER NOTE - OBJECTIVE STATEMENT
26 y/o male with hx of asthma presents to the Ed c/o cough, shortness of breath and sinus congestion for the pat 3 days. Has been taking his inhaler more frequently with little relief. Notes he feels as if his congestion is causing his shortness of breath. Taking otc, meds with minimal relief. Denies chest pain, abdominal pain, fevers, nausea, vomiting.

## 2023-04-26 NOTE — ED PROVIDER NOTE - ATTENDING APP SHARED VISIT CONTRIBUTION OF CARE
The patient discussed with PA    Asthma exacerbation    I, Chepe Bowden, performed the initial face to face bedside interview with this patient regarding history of present illness, review of symptoms and relevant past medical, social and family history.  I completed an independent physical examination.  I was the initial provider who evaluated this patient. I have signed out the follow up of any pending tests (i.e. labs, radiological studies) to the ACP.  I have communicated the patient’s plan of care and disposition with the ACP.

## 2023-04-26 NOTE — ED PROVIDER NOTE - NS ED ATTENDING STATEMENT MOD
This was a shared visit with the FRANCES. I reviewed and verified the documentation and independently performed the documented:

## 2023-04-26 NOTE — ED PROVIDER NOTE - PATIENT PORTAL LINK FT
You can access the FollowMyHealth Patient Portal offered by [facility] by registering at the following website: [email or web address]. By joining [employer]’s FollowMyHealth portal, you will also be able to view your health information using other applications (apps) compatible with our system.

## 2023-05-29 ENCOUNTER — EMERGENCY (EMERGENCY)
Facility: HOSPITAL | Age: 28
LOS: 1 days | Discharge: HOME | End: 2023-05-29
Attending: EMERGENCY MEDICINE
Payer: COMMERCIAL

## 2023-05-29 VITALS
HEIGHT: 72 IN | OXYGEN SATURATION: 99 % | RESPIRATION RATE: 20 BRPM | TEMPERATURE: 99 F | WEIGHT: 218.04 LBS | DIASTOLIC BLOOD PRESSURE: 71 MMHG | SYSTOLIC BLOOD PRESSURE: 112 MMHG | HEART RATE: 64 BPM

## 2023-05-29 VITALS — TEMPERATURE: 99 F

## 2023-05-29 PROCEDURE — 94640 AIRWAY INHALATION TREATMENT: CPT

## 2023-05-29 PROCEDURE — 71046 X-RAY EXAM CHEST 2 VIEWS: CPT | Mod: 26

## 2023-05-29 PROCEDURE — 99284 EMERGENCY DEPT VISIT MOD MDM: CPT | Mod: 25

## 2023-05-29 PROCEDURE — 99284 EMERGENCY DEPT VISIT MOD MDM: CPT

## 2023-05-29 PROCEDURE — 71046 X-RAY EXAM CHEST 2 VIEWS: CPT

## 2023-05-29 RX ORDER — SODIUM CHLORIDE 9 MG/ML
1000 INJECTION INTRAMUSCULAR; INTRAVENOUS; SUBCUTANEOUS ONCE
Refills: 0 | Status: COMPLETED | OUTPATIENT
Start: 2023-05-29 | End: 2023-05-29

## 2023-05-29 RX ORDER — ALBUTEROL 90 UG/1
2 AEROSOL, METERED ORAL
Qty: 1 | Refills: 0
Start: 2023-05-29 | End: 2023-06-02

## 2023-05-29 RX ORDER — IPRATROPIUM/ALBUTEROL SULFATE 18-103MCG
3 AEROSOL WITH ADAPTER (GRAM) INHALATION EVERY 6 HOURS
Refills: 0 | Status: DISCONTINUED | OUTPATIENT
Start: 2023-05-29 | End: 2023-06-06

## 2023-05-29 RX ADMIN — SODIUM CHLORIDE 1000 MILLILITER(S): 9 INJECTION INTRAMUSCULAR; INTRAVENOUS; SUBCUTANEOUS at 15:46

## 2023-05-29 RX ADMIN — Medication 3 MILLILITER(S): at 15:50

## 2023-05-29 RX ADMIN — Medication 125 MILLIGRAM(S): at 15:49

## 2023-05-29 NOTE — ED PROVIDER NOTE - PATIENT PORTAL LINK FT
You can access the FollowMyHealth Patient Portal offered by [facility] by registering at the following website: [email or web address]. By joining [employer]’s FollowMyHealth portal, you will also be able to view your health information using other applications (apps) compatible with our system.

## 2023-05-29 NOTE — ED ADULT NURSE NOTE - NSFALLUNIVINTERV_ED_ALL_ED
Bed/Stretcher in lowest position, wheels locked, appropriate side rails in place/Call bell, personal items and telephone in reach/Instruct patient to call for assistance before getting out of bed/chair/stretcher/Non-slip footwear applied when patient is off stretcher/[place] to call system/Physically safe environment - no spills, clutter or unnecessary equipment/Purposeful proactive rounding/Room/bathroom lighting operational, light cord in reach

## 2023-05-29 NOTE — ED PROVIDER NOTE - CLINICAL SUMMARY MEDICAL DECISION MAKING FREE TEXT BOX
27-year-old male presented to ED complaining of wheezing for 2 to 3 weeks and asthma exacerbation for 1 month.  Patient admits that he was seen in the ED and prescribed albuterol and steroid which helped.  Patient states that he later was seen at urgent care center with was given similar medications that improved his condition.  Patient said he was seen by his pulmonologist who prescribed him an daily steroid medication which is also helped.  Patient admits to worsening of wheezing and a low-grade fever prompting him to come in to be seen today. 27-year-old male presented to ED complaining of wheezing for 2 to 3 weeks and asthma exacerbation for 1 month.  Patient admits that he was seen in the ED and prescribed albuterol and steroid which helped.  Patient states that he later was seen at urgent care center with was given similar medications that improved his condition.  Patient said he was seen by his pulmonologist who prescribed him an daily steroid medication which is also helped.  Patient admits to worsening of wheezing and a low-grade fever prompting him to come in to be seen today. HEENT: Normal findings, Eyes : PERRLA, EOMI , Nares clear and Throat : WNL  Lungs: Wheezing noted in all lung field.   CVS: S1-S2 , with no murmurs  Abd : Normal BS, with no tenderness or organomegaly  Ext: Normal findings

## 2023-05-29 NOTE — ED ADULT NURSE NOTE - OBJECTIVE STATEMENT
Pt arrives with c/o intermittent "asthma exacerbation" to which he has been seen and treated without complete relief. Pt presents today with c/o recurrence of same symptoms as he has been having the last month. Pt calm and cooperative with respirations even and unlabored. No wheezing noted.

## 2023-05-29 NOTE — ED PROVIDER NOTE - OBJECTIVE STATEMENT
27-year-old male presented to ED complaining of wheezing for 2 to 3 weeks and asthma exacerbation for 1 month.  Patient admits that he was seen in the ED and prescribed albuterol and steroid which helped.  Patient states that he later was seen at urgent care center with was given similar medications that improved his condition.  Patient said he was seen by his pulmonologist who prescribed him an daily steroid medication which is also helped.  Patient admits to worsening of wheezing and a low-grade fever prompting him to come in to be seen today.  Patient denies any significant past medical or surgical illness, allergies to medication or any other issue at this time.

## 2023-05-29 NOTE — ED PROVIDER NOTE - NSFOLLOWUPINSTRUCTIONS_ED_ALL_ED_FT
Continue with medication as prescribed   Followup with Primary Care Physician as discussed  Followup with your Pulmonologist

## 2023-08-08 ENCOUNTER — APPOINTMENT (OUTPATIENT)
Age: 28
End: 2023-08-08

## 2023-10-13 ENCOUNTER — APPOINTMENT (OUTPATIENT)
Age: 28
End: 2023-10-13

## 2023-11-06 ENCOUNTER — EMERGENCY (EMERGENCY)
Facility: HOSPITAL | Age: 28
LOS: 1 days | Discharge: HOME | End: 2023-11-06
Attending: EMERGENCY MEDICINE
Payer: SELF-PAY

## 2023-11-06 VITALS
HEART RATE: 78 BPM | TEMPERATURE: 98 F | RESPIRATION RATE: 18 BRPM | OXYGEN SATURATION: 99 % | SYSTOLIC BLOOD PRESSURE: 118 MMHG | DIASTOLIC BLOOD PRESSURE: 74 MMHG | WEIGHT: 233.69 LBS

## 2023-11-06 PROCEDURE — 94640 AIRWAY INHALATION TREATMENT: CPT

## 2023-11-06 PROCEDURE — 99283 EMERGENCY DEPT VISIT LOW MDM: CPT | Mod: 25

## 2023-11-06 PROCEDURE — 99284 EMERGENCY DEPT VISIT MOD MDM: CPT

## 2023-11-06 RX ORDER — ACETAMINOPHEN 500 MG
975 TABLET ORAL ONCE
Refills: 0 | Status: COMPLETED | OUTPATIENT
Start: 2023-11-06 | End: 2023-11-06

## 2023-11-06 RX ORDER — IPRATROPIUM/ALBUTEROL SULFATE 18-103MCG
3 AEROSOL WITH ADAPTER (GRAM) INHALATION
Refills: 0 | Status: COMPLETED | OUTPATIENT
Start: 2023-11-06 | End: 2023-11-06

## 2023-11-06 RX ADMIN — Medication 3 MILLILITER(S): at 17:04

## 2023-11-06 RX ADMIN — Medication 60 MILLIGRAM(S): at 16:47

## 2023-11-06 RX ADMIN — Medication 975 MILLIGRAM(S): at 16:47

## 2023-11-06 RX ADMIN — Medication 3 MILLILITER(S): at 17:01

## 2023-11-06 RX ADMIN — Medication 3 MILLILITER(S): at 17:05

## 2023-11-06 NOTE — ED PROVIDER NOTE - NSFOLLOWUPINSTRUCTIONS_ED_ALL_ED_FT
Asthma    Asthma is a condition in which the airways tighten and narrow, making it difficult to breath. Asthma episodes, also called asthma attacks, range from minor to life-threatening. Symptoms include wheezing, coughing, chest tightness, or shortness of breath. The diagnosis of asthma is made by a review of your medical history and a physical exam, but may involve additional testing. Asthma cannot be cured, but medicines and lifestyle changes can help control it. Avoid triggers of asthma which may include animal dander, pollen, mold, smoke, air pollutants, etc.     Take 40mg Prednisone once daily for 5 days  Continue using nebulizer every few hours  Follow up with your primary care doctor and/or pulmonologist     SEEK IMMEDIATE MEDICAL CARE IF YOU HAVE ANY OF THE FOLLOWING SYMPTOMS: worsening of symptoms, shortness of breath at rest, chest pain, bluish discoloration to lips or fingertips, lightheadedness/dizziness, or fever.

## 2023-11-06 NOTE — ED PROVIDER NOTE - PATIENT PORTAL LINK FT
You can access the FollowMyHealth Patient Portal offered by [facility] by registering at the following website: [email or web address]. By joining [employer]’s FollowMyHealth portal, you will also be able to view your health information using other applications (apps) compatible with our system.

## 2023-11-06 NOTE — ED PROVIDER NOTE - PROGRESS NOTE DETAILS
patient DC in stable condition Rx sent to patient pharmacy patient will follow up with his pulmonologist as discussed Patient wheezing on reevaluation.  Prior to nebulizer treatment;  wheezing in all lung fields   DuoNeb treatment;  lungs clear to auscultation bilateral no wheezing noted , patient tolerated medication well in ED Pt moved form intake Room. Pt seen and evaluated by intake Physician. HPI, Physical examination performed by intake Physician . Note reviewed and followup examination performed by me consistent with initial assessment. Agrees with intake Physician plan and tests.

## 2023-11-06 NOTE — ED PROVIDER NOTE - CLINICAL SUMMARY MEDICAL DECISION MAKING FREE TEXT BOX
28 y/o male with PMHx of asthma no prior intubations presents to the ED c/o asthma exacerbation. Will give duo-nebs, dose of prednisone in ED, re-evaluate, if improved give 5 days course of steroids, continue using nebulizer at home

## 2023-11-06 NOTE — ED PROVIDER NOTE - OBJECTIVE STATEMENT
26 y/o male with PMHx of asthma no prior intubations presents to the ED c/o asthma exacerbation. Pt with viral symptoms for the past 3 days, on first day of symptoms he had sore throat that has been improving, next developed fevers which improved with antipyretics, no fevers today Pt notes for the past 2 days he has had wheezing, SOB, states used nebs however wheezing keeps returning. Pt notes numerous sick contacts at home, is vaccinated against flu and covid.

## 2023-11-06 NOTE — ED PROVIDER NOTE - PHYSICAL EXAMINATION
Vitals: WNL  Gen: laying comfortably in NAD   Head: NCAT    ENT: EOMI. No exudates, mild cervical lymphadenopathy   CV: RRR. Audible S1 and S2. No murmurs. 2+ radial and DP pulses   Pulm: no respiratory distress, mild diffuse expiratory wheeze  Abd: soft, NTND, no rebound, no guarding  Musculoskeletal:  No peripheral edema, no calf ttp  Neurologic: AAOx3, no focal deficits  : no CVA tenderness

## 2024-05-07 ENCOUNTER — EMERGENCY (EMERGENCY)
Facility: HOSPITAL | Age: 29
LOS: 1 days | End: 2024-05-07
Attending: EMERGENCY MEDICINE
Payer: MEDICAID

## 2024-05-07 VITALS
WEIGHT: 242.51 LBS | TEMPERATURE: 98 F | DIASTOLIC BLOOD PRESSURE: 62 MMHG | HEART RATE: 92 BPM | SYSTOLIC BLOOD PRESSURE: 128 MMHG | RESPIRATION RATE: 20 BRPM | HEIGHT: 78 IN | OXYGEN SATURATION: 98 %

## 2024-05-07 PROCEDURE — 96374 THER/PROPH/DIAG INJ IV PUSH: CPT

## 2024-05-07 PROCEDURE — 99284 EMERGENCY DEPT VISIT MOD MDM: CPT | Mod: 25

## 2024-05-07 PROCEDURE — 94640 AIRWAY INHALATION TREATMENT: CPT

## 2024-05-07 PROCEDURE — 99284 EMERGENCY DEPT VISIT MOD MDM: CPT

## 2024-05-07 PROCEDURE — 96375 TX/PRO/DX INJ NEW DRUG ADDON: CPT

## 2024-05-07 RX ORDER — FLUTICASONE PROPIONATE 220 MCG
1 AEROSOL WITH ADAPTER (GRAM) INHALATION
Qty: 1 | Refills: 0
Start: 2024-05-07

## 2024-05-07 RX ORDER — LORATADINE 10 MG/1
10 TABLET ORAL ONCE
Refills: 0 | Status: COMPLETED | OUTPATIENT
Start: 2024-05-07 | End: 2024-05-07

## 2024-05-07 RX ORDER — ALBUTEROL 90 UG/1
3 AEROSOL, METERED ORAL
Qty: 13 | Refills: 0
Start: 2024-05-07

## 2024-05-07 RX ORDER — MAGNESIUM SULFATE 500 MG/ML
2 VIAL (ML) INJECTION ONCE
Refills: 0 | Status: COMPLETED | OUTPATIENT
Start: 2024-05-07 | End: 2024-05-07

## 2024-05-07 RX ORDER — IPRATROPIUM/ALBUTEROL SULFATE 18-103MCG
3 AEROSOL WITH ADAPTER (GRAM) INHALATION
Refills: 0 | Status: COMPLETED | OUTPATIENT
Start: 2024-05-07 | End: 2024-05-07

## 2024-05-07 RX ADMIN — Medication 125 MILLIGRAM(S): at 21:16

## 2024-05-07 RX ADMIN — Medication 3 MILLILITER(S): at 21:48

## 2024-05-07 RX ADMIN — Medication 3 MILLILITER(S): at 21:16

## 2024-05-07 RX ADMIN — LORATADINE 10 MILLIGRAM(S): 10 TABLET ORAL at 21:16

## 2024-05-07 RX ADMIN — Medication 150 GRAM(S): at 21:25

## 2024-05-07 RX ADMIN — Medication 3 MILLILITER(S): at 21:25

## 2024-05-07 NOTE — ED ADULT TRIAGE NOTE - CHIEF COMPLAINT QUOTE
Pt arrives to ED c/o asthma exacerbation started over a week ago - was seen in UC and prescribed steroids which was finished few days ago  . Breathing easy and unlabored

## 2024-05-07 NOTE — ED PROVIDER NOTE - NSFOLLOWUPINSTRUCTIONS_ED_ALL_ED_FT
Please take prednisone taper as directed  Use albuterol nebulizer treatments every 4-6 hours as needed  Take either claritin or zyrtec daily for allergies  Please follow up with primary care doctor in 2-3 days  Follow up with your pulmonologist   Return to ER for any new or worsening symptoms    Asthma    Asthma is a condition in which the airways tighten and narrow, making it difficult to breath. Asthma episodes, also called asthma attacks, range from minor to life-threatening. Symptoms include wheezing, coughing, chest tightness, or shortness of breath. The diagnosis of asthma is made by a review of your medical history and a physical exam, but may involve additional testing. Asthma cannot be cured, but medicines and lifestyle changes can help control it. Avoid triggers of asthma which may include animal dander, pollen, mold, smoke, air pollutants, etc.     SEEK IMMEDIATE MEDICAL CARE IF YOU HAVE ANY OF THE FOLLOWING SYMPTOMS: worsening of symptoms, shortness of breath at rest, chest pain, bluish discoloration to lips or fingertips, lightheadedness/dizziness, or fever.

## 2024-05-07 NOTE — ED PROVIDER NOTE - OBJECTIVE STATEMENT
27 yo M with PMHx asthma, seasonal allergies presents to ER c/o asthma exacerbation. Reports since last week increased cough, wheezing, SOB, itchy eyes/congestion he relates to allergies. Had been using albuterol inhaler and nebs at home, went to  Rx prednisone 40mg x 5 days. Felt better while on steroids but as soon as finished 3 days ago symptoms worsening again. Feels he cannot make it to 4 hr acacia between neb treatments. Also taking zyrtec daily. Denies fever, CP, n/v, leg pain or swelling. Has outpt pulmonologist 27 yo M with PMHx asthma, seasonal allergies presents to ER c/o asthma exacerbation. Reports since last week increased cough, wheezing, SOB, itchy eyes/congestion he relates to allergies. Had been using albuterol inhaler and nebs at home, went to  Rx prednisone 40mg x 5 days. Felt better while on steroids but as soon as finished 3 days ago symptoms worsening again. Feels he cannot make it to 4 hr acacia between neb treatments. Also taking zyrtec daily. Denies fever, CP, n/v, leg pain or swelling. Has outpt pulmonologist. Hospitalized overnight as child, never intubated.

## 2024-05-07 NOTE — ED PROVIDER NOTE - ATTENDING APP SHARED VISIT CONTRIBUTION OF CARE
indep eval  asthma exacerbation  no rellief w albuterol  pox 93 but >98% w deep breaths  post clear occ eew anterior lung field  agree w nebs steroids mag  will reassess  agree w narda and mngt

## 2024-05-07 NOTE — ED PROVIDER NOTE - PHYSICAL EXAMINATION
Gen: No acute distress, non toxic  HEENT: Mucous membranes moist, pink conjunctivae, EOMI, nasal congestion  CV: RRR, nl s1/s2.  Resp: Lungs tight, faint wheezing bases, some shortness of breath after speaking  GI: Abdomen soft, NT, ND. No rebound, no guarding  : No CVAT  Neuro: A&O x 3, moving all 4 extremities  MSK: No spine or joint tenderness to palpation  Skin: No rashes. intact and perfused.

## 2024-05-07 NOTE — ED PROVIDER NOTE - CARE PROVIDER_API CALL
Geovany Reagan  Pulmonary Disease  [address], [address]  [place] [address]  Phone: [phone]  Fax: [phone]  Follow Up Time:

## 2024-05-07 NOTE — ED PROVIDER NOTE - CLINICAL SUMMARY MEDICAL DECISION MAKING FREE TEXT BOX
27 yo M with PMHx asthma, seasonal allergies presents to ER c/o asthma exacerbation. Reports since last week increased cough, wheezing, SOB, itchy eyes/congestion he relates to allergies. Had been using albuterol inhaler and nebs at home, went to  Rx prednisone 40mg x 5 days. Felt better while on steroids but as soon as finished 3 days ago symptoms worsening again. Feels he cannot make it to 4 hr acacia between neb treatments. Also taking zyrtec daily. VSS in ED, lungs tight , faint wheezing at bases, O2 95-98% room air. Will give iv steroids/mag, duonebs x 3, reassess. 29 yo M with PMHx asthma, seasonal allergies presents to ER c/o asthma exacerbation. Reports since last week increased cough, wheezing, SOB, itchy eyes/congestion he relates to allergies. Had been using albuterol inhaler and nebs at home, went to  Rx prednisone 40mg x 5 days. Felt better while on steroids but as soon as finished 3 days ago symptoms worsening again. Feels he cannot make it to 4 hr acacia between neb treatments. Also taking zyrtec daily. VSS in ED, lungs tight , faint wheezing at bases, O2 95-98% room air. Will give iv steroids/mag, duonebs x 3, reassess.    Pt feeling better after treatment, discussed observation for continued iv steorids/nebs, states he needs to go home. VS improving, lungs cta on reassessment very faint wheezing O295-98% RA. Will dc on longer prednisone course, sent albuterol nebs, reintiate arnuity pt previously on. daily claritin or zyrtec. pt will f/u with pulm outpt.

## 2024-05-08 NOTE — ED ADULT NURSE NOTE - NS ED NOTE ABUSE RESPONSE YN
Introduced self to pt. Pt pleasant, NAD noted. NACL bolus initiated.       Alex Gordon RN  08/20/23 1919 Yes

## 2024-05-08 NOTE — ED ADULT NURSE NOTE - OBJECTIVE STATEMENT
assumed care of pt from triage, pt AAOX3, resp. even and unlabored on RA, pt c/o asthma exacerbation x1 week, pt endorses he was seen at UC and given a steroid pack but felt no relief, neg. wheezing/SOB//chest pain/headache/dizziness/cough, pt 98% on

## 2025-03-07 NOTE — ED ADULT TRIAGE NOTE - CCCP TRG CHIEF CMPLNT
"  Date of Discharge:  3/7/2025    Discharge Diagnosis:   Active Hospital Problems    Diagnosis  POA    **CAD S/P percutaneous coronary angioplasty [I25.10, Z98.61]  Not Applicable    Accelerating angina [I20.0]  Yes    Coronary artery disease [I25.10]  Unknown      Resolved Hospital Problems   No resolved problems to display.       Presenting Problem/History of Present Illness  Accelerating angina [I20.0]  Coronary artery disease involving native coronary artery of native heart with other form of angina pectoris [I25.118]  CAD S/P percutaneous coronary angioplasty [I25.10, Z98.61]      Hospital Course  Patient is a 56 y.o. male presented for elective cardiac catheterization due to anginal discomfort with known CAD.  He underwent his procedure 3/6/2025.  This showed a mid/distal significant LAD stenosis with widely patent stents in the proximal LAD and circumflex.  PCI was performed on the LAD.  The patient was observed overnight he had no complications observed from the procedure.  He was stable for discharge on 3/7/2025    Procedures Performed  Procedure(s):  Left Heart Cath  Percutaneous Coronary Intervention       Consults:   Consults       No orders found for last 30 day(s).            Pertinent Test Results:  See chart review    Ejection Fraction  No results found for: \"EF\"    Echo EF Estimated  No results found for: \"ECHOEFEST\"    Nuclear Stress Ejection Fraction  No components found for: \"NUCEF\"    Cath Ejection Fraction Quantitative  No results found for: \"CATHEF\"    Condition on Discharge: Good    Physical Exam at Discharge    Vital Signs  Temp:  [97 °F (36.1 °C)-98.8 °F (37.1 °C)] 98.8 °F (37.1 °C)  Heart Rate:  [68-83] 82  Resp:  [16-18] 18  BP: (108-126)/(68-88) 125/82    Physical Exam:   General Appearance alert, appears stated age, and cooperative  Lungs clear to auscultation, respirations regular, respirations even, and respirations unlabored  Heart regular rhythm & normal rate, normal S1, S2, no " murmur, no gallop, no rub, and no click  Extremities moves extremities well, no edema, no cyanosis, and no redness    Discharge Disposition  Home or Self Care    Discharge Medications     Discharge Medications        Continue These Medications        Instructions Start Date   amLODIPine 5 MG tablet  Commonly known as: NORVASC   5 mg, Oral, Daily      aspirin 81 MG chewable tablet   81 mg, Daily      atorvastatin 80 MG tablet  Commonly known as: LIPITOR   80 mg, Oral, Daily      carvedilol 12.5 MG tablet  Commonly known as: COREG   12.5 mg, 2 Times Daily With Meals      cetirizine 10 MG tablet  Commonly known as: zyrTEC   10 mg, Daily      clopidogrel 75 MG tablet  Commonly known as: PLAVIX   75 mg, Daily      desvenlafaxine 50 MG 24 hr tablet  Commonly known as: PRISTIQ   50 mg, Oral, Daily      ezetimibe 10 MG tablet  Commonly known as: ZETIA   10 mg, Daily      hydroCHLOROthiazide 25 MG tablet   12.5 mg, Daily      ketoconazole 2 % cream  Commonly known as: NIZORAL   1 Application, Topical, Daily      levothyroxine 75 MCG tablet  Commonly known as: SYNTHROID, LEVOTHROID   75 mcg, Oral, Daily      losartan 100 MG tablet  Commonly known as: COZAAR   100 mg, Oral, Daily      nitroglycerin 0.4 MG SL tablet  Commonly known as: NITROSTAT   0.4 mg, Sublingual, Every 5 Minutes PRN, Take no more than 3 doses in 15 minutes.      Wegovy 2.4 MG/0.75ML solution auto-injector  Generic drug: Semaglutide-Weight Management   2.4 mg, Subcutaneous, Weekly      Wellbutrin  MG 24 hr tablet  Generic drug: buPROPion XL   300 mg, Daily               Discharge Diet: Healthy heart    Activity at Discharge: No heavy lifting right hand for 48 hours    Follow-up Appointments  Future Appointments   Date Time Provider Department Center   4/11/2025  3:30 PM Angela Smith MD [facility]   5/7/2025  4:00 PM Nokhbehzaeim, Mahrokh, MD [facility]   6/10/2025  3:45 PM Samy Tam MD [facility]         Test  Results Pending at Discharge  Pending Labs       Order Current Status    Hemoglobin A1c In process             Yovani Cordero MD  03/07/25  08:52 EST    Time: Discharge 20 min         asthma exacerbation

## 2025-05-15 ENCOUNTER — TRANSCRIPTION ENCOUNTER (OUTPATIENT)
Age: 30
End: 2025-05-15

## 2025-05-15 ENCOUNTER — EMERGENCY (EMERGENCY)
Facility: HOSPITAL | Age: 30
LOS: 1 days | End: 2025-05-15
Attending: EMERGENCY MEDICINE
Payer: COMMERCIAL

## 2025-05-15 VITALS
RESPIRATION RATE: 16 BRPM | SYSTOLIC BLOOD PRESSURE: 107 MMHG | OXYGEN SATURATION: 97 % | HEIGHT: 72 IN | DIASTOLIC BLOOD PRESSURE: 67 MMHG | TEMPERATURE: 98 F | HEART RATE: 64 BPM | WEIGHT: 248.46 LBS

## 2025-05-15 PROCEDURE — 94640 AIRWAY INHALATION TREATMENT: CPT

## 2025-05-15 PROCEDURE — 71046 X-RAY EXAM CHEST 2 VIEWS: CPT | Mod: 26

## 2025-05-15 PROCEDURE — 99284 EMERGENCY DEPT VISIT MOD MDM: CPT

## 2025-05-15 PROCEDURE — 71046 X-RAY EXAM CHEST 2 VIEWS: CPT

## 2025-05-15 PROCEDURE — 99283 EMERGENCY DEPT VISIT LOW MDM: CPT | Mod: 25

## 2025-05-15 RX ORDER — ALBUTEROL SULFATE 2.5 MG/3ML
2 VIAL, NEBULIZER (ML) INHALATION ONCE
Refills: 0 | Status: COMPLETED | OUTPATIENT
Start: 2025-05-15 | End: 2025-05-15

## 2025-05-15 RX ORDER — ALBUTEROL SULFATE 2.5 MG/3ML
2 VIAL, NEBULIZER (ML) INHALATION
Qty: 1 | Refills: 0
Start: 2025-05-15

## 2025-05-15 RX ORDER — PREDNISONE 20 MG/1
50 TABLET ORAL ONCE
Refills: 0 | Status: COMPLETED | OUTPATIENT
Start: 2025-05-15 | End: 2025-05-15

## 2025-05-15 RX ORDER — PREDNISONE 20 MG/1
1 TABLET ORAL
Qty: 22 | Refills: 0
Start: 2025-05-15

## 2025-05-15 RX ADMIN — Medication 2 PUFF(S): at 18:28

## 2025-05-15 RX ADMIN — PREDNISONE 50 MILLIGRAM(S): 20 TABLET ORAL at 18:28

## 2025-05-15 NOTE — ED PROVIDER NOTE - OBJECTIVE STATEMENT
29yom w/ asthma p/w about 10 days of worsening chest tightness and asthma symptoms. He reports he has had congestion and itchy eyes which he attributes to seasonal allergies associated with intermittent wheezing and chest tightness. He uses albuterol MDI and nebs as needed with improvement but notes he has been needing it more frequently. No fever, chills, or other systemic symptoms.

## 2025-05-15 NOTE — ED ADULT TRIAGE NOTE - CHIEF COMPLAINT QUOTE
c/o asthma exacerbation.  Has progressively gotten worse.  Has been using inhaler and nebs with minimal relief .

## 2025-05-15 NOTE — ED PROVIDER NOTE - CLINICAL SUMMARY MEDICAL DECISION MAKING FREE TEXT BOX
Mild exacerbation of asthma likely environmental trigger. Mild exacerbation of asthma likely environmental trigger. CXR is clear with no pneumonia. Will provide spacer to use with MDI and start prednisone taper.

## 2025-05-15 NOTE — ED ADULT NURSE REASSESSMENT NOTE - NS ED NURSE REASSESS COMMENT FT1
Pt assessed, treated and d/c prior to RN assessment by PA, pt medicated as per orders, d/c paperwork provided with follow up information.

## 2025-05-15 NOTE — ED PROVIDER NOTE - NSFOLLOWUPINSTRUCTIONS_ED_ALL_ED_FT
Use albuterol 2-4 puffs with spacer every 4 hours if needed needed for wheezing  Continue prednisone taper as prescribed  Follow up with your primary care provider  Return to the ER with any new, worsening or persistent symptoms.

## 2025-05-15 NOTE — ED PROVIDER NOTE - PATIENT PORTAL LINK FT
You can access the FollowMyHealth Patient Portal offered by [facility] by registering at the following website: [email or web address]. By joining [employer]’s FollowMyHealth portal, you will also be able to view your health information using other applications (apps) compatible with our system.